# Patient Record
Sex: FEMALE | URBAN - METROPOLITAN AREA
[De-identification: names, ages, dates, MRNs, and addresses within clinical notes are randomized per-mention and may not be internally consistent; named-entity substitution may affect disease eponyms.]

---

## 2021-01-28 ENCOUNTER — TRANSFERRED RECORDS (OUTPATIENT)
Dept: HEALTH INFORMATION MANAGEMENT | Facility: CLINIC | Age: 26
End: 2021-01-28

## 2021-03-10 ENCOUNTER — VIRTUAL VISIT (OUTPATIENT)
Dept: GASTROENTEROLOGY | Facility: CLINIC | Age: 26
End: 2021-03-10
Payer: COMMERCIAL

## 2021-03-10 ENCOUNTER — TELEPHONE (OUTPATIENT)
Dept: GASTROENTEROLOGY | Facility: CLINIC | Age: 26
End: 2021-03-10

## 2021-03-10 DIAGNOSIS — K58.2 IRRITABLE BOWEL SYNDROME WITH BOTH CONSTIPATION AND DIARRHEA: ICD-10-CM

## 2021-03-10 DIAGNOSIS — K82.4 GALLBLADDER POLYP: Primary | ICD-10-CM

## 2021-03-10 PROCEDURE — 99204 OFFICE O/P NEW MOD 45 MIN: CPT | Mod: GT | Performed by: INTERNAL MEDICINE

## 2021-03-10 RX ORDER — CETIRIZINE HYDROCHLORIDE 10 MG/1
10 TABLET ORAL DAILY
COMMUNITY

## 2021-03-10 RX ORDER — IBUPROFEN 600 MG/1
TABLET, FILM COATED ORAL
COMMUNITY
Start: 2020-07-15

## 2021-03-10 RX ORDER — DESOGESTREL AND ETHINYL ESTRADIOL 0.15-0.03
KIT ORAL
COMMUNITY
Start: 2020-11-18

## 2021-03-10 RX ORDER — ALBUTEROL SULFATE 90 UG/1
1-2 AEROSOL, METERED RESPIRATORY (INHALATION)
COMMUNITY
Start: 2019-06-04

## 2021-03-10 NOTE — PATIENT INSTRUCTIONS
You may want to try using enteric coated peppermint oil (I.e IB guard) as needed for abdominal pain/cramping.    Consider starting a daily fiber supplement like citrucel or metamucil - continue to drink a lot of water.    Keep miralax on hand - start taking it once a day if you go 1-2 days without a bowel movement.    Please have a repeat ultrasound done in 6 months.     Let me know if you have questions or concerns - you can follow-up with me in about 3 months or return to your previous gastroenterologist.

## 2021-03-10 NOTE — PROGRESS NOTES
HPI:    Janiya  presents today for a video visit to discuss intermittent episodes of diarrhea and cramping abdominal pain.  Symptoms began suddenly in October with nausea, vomiting, abdominal pain and diarrhea.  Was seen in urgent care and had stool studies and blood work which were all normal.  Was referred to GI who she got in to see about a month later who did further work-up including a colonoscopy with random biopsies (normal aside from hyperplastic polyp) a CT which showed a vascular lesion of the liver which prompted an MRI which showed a hemangioma per patient.  Has continued to alternate between diarrhea and constipation.  Will go sometimes 4-5 days without a BM which will be followed by loose stools.  Has also had episodes of severe cramping and diarrhea most recently after eating out at a restaurant which prompted an ER visit - labs at the time were normal.  Had a US of the GB which showed small polyps but otherwise unremarkable.    Has been treated with bentyl but it caused her to feel fatigued and some dizziness at higher doses.  Has recently been seen by a nutritionist and started on a low fodmap diet which seems to help although she did have some cramping after eating take out last night.       FMH: aunt with colitis and cirrhosis        O:    Gen: no acute distress  HEENT: NCAT  Neck: normal ROM  Resp: nonlabored breathing  Neuro: no gross deficits  Psych: appropriate mood and affect    US:  FINDINGS:    GALLBLADDER: Gallbladder is decompressed without stones. Small cholesterol polyps, and cholesterolosis noted, without stones or pericholecystic fluid.    BILE DUCTS: No biliary dilatation. The common duct measures 2.8 mm.    LIVER: 2.4 cm echogenic lesion in the hepatic dome corresponds to the known hemangioma seen at recent MRI. No adjacent smaller echogenic lesion measuring only 8 mm in size has no definite MR correlate although sonographic imaging features are suggestive of an additional  small hemangioma.    RIGHT KIDNEY: No hydronephrosis.    PANCREAS: The visualized portions are normal.    No right upper quadrant ascites.    IMPRESSION:  1.  Gallbladder cholesterolosis, without sonographic evidence of cholelithiasis or cholecystitis.  2.  No etiology identified to explain patient's upper abdominal pain.    CT:    HEPATOBILIARY: There is an irregular 2.3 x 1.5 cm lesion in the dome of the right hepatic lobe with some peripheral faint enhancement. Given patient age, findings likely represent benign vascular lesions such as cavernous hemangioma, however, further evaluation with MRI liver recommended for confirmation. Low-attenuation subcentimeter liver lesion(s) compatible with benign cysts or other benign lesions. No specific evaluation or follow-up is recommended in a low risk patient. Single tiny gallstone. No bile duct dilatation.    PANCREAS: Normal.    SPLEEN: Normal.    ADRENAL GLANDS: Normal.    KIDNEYS/BLADDER: Normal.    BOWEL: Normal.    LYMPH NODES: Normal.    VASCULATURE: Unremarkable.    PELVIC ORGANS: Normal.    ADDITIONAL FINDINGS: Tiny amount of free fluid in the deep pelvis.    MUSCULOSKELETAL: Normal.    IMPRESSION:   1.  Irregular 2.3 x 1.5 cm indeterminate lesion in the dome of the right hepatic lobe as described above. Given patient age, findings while indeterminant likely represent benign vascular lesions such as cavernous hemangioma. Recommend further evaluation with MRI liver for confirmation.  2.  Single tiny gallstone.  3.  Tiny amount of free fluid in the deep pelvis likely physiologic.      Cscope:    Findings:  Normal finding.  Location - ileum.  Polyp location: ascending colon.  Quantity: 1.  Size: 3 mm.  Polyp shape:  sessile.         Maneuver: polypectomy was performed with a cold snare.       Removal:  complete.  Retrieval: complete.  Bleeding: none.  Hemorrhoids.  Internal and external hemorrhoids without bleeding.  Remainder of the exam is normal.  Random biopsies  were taken throughout the colon to rule out microscopic colitis.  Pathology Results:  A: COLON, RANDOM, BIOPSY:           1. Normal colonic mucosa           2. Negative for microscopic, active, and chronic colitis     B: COLON, ASCENDING, POLYP:           1. Hyperplastic polyp     Assessment and Plan:    # irritable bowel syndrome - mixed type - may benefit from adding a fiber supplement to help keep bowels more regular.  Also encouraged to use miralax as needed for constipation.  Did not tolerate bentyl due to side effects, she can try enteric coated peppermint oil PRN.  Does seem to be a strong dietary component to her symptoms - was encouraged to continue to keep a food journal and follow-up with nutritionist.    # gall bladder polyps - will repeat US in 6 months    RTC 3 months    Amanda Hameed,      Video-Visit Details     Type of service:  Video Visit     Video Start Time: 8:32 AM  Video End Time (time video stopped): 8:52 AM    Originating Location (pt. Location): Home     Distant Location (provider location):  [facility]      Mode of Communication:  Video Conference via VisTracks

## 2021-03-10 NOTE — PROGRESS NOTES
Janiya is a 25 year old who is being evaluated via a billable video visit.      How would you like to obtain your AVS? Mail a copy  If the video visit is dropped, the invitation should be resent by: Text to cell phone: [phone]   Will anyone else be joining your video visit? No      Video Start Time:   Video-Visit Details    Type of service:  Video Visit    Video End Time:    Originating Location (pt. Location):     Distant Location (provider location):  [facility]     Platform used for Video Visit:   Rony Mane CMA

## 2021-03-10 NOTE — TELEPHONE ENCOUNTER
1st attempt.    Left voicemail to schedule 3 mo virtual follow up with Dr. Hameed (around 6/10/21).     Also, for ultrasound    Katia Messer  Medical Specialty Procedure   [facility] [place]

## 2021-04-09 NOTE — TELEPHONE ENCOUNTER
2nd attempt.     See note below.    Katia Messer  Medical Specialty Procedure   [facility] [place]

## 2021-05-01 ENCOUNTER — HEALTH MAINTENANCE LETTER (OUTPATIENT)
Age: 26
End: 2021-05-01

## 2021-05-05 NOTE — TELEPHONE ENCOUNTER
3rd attempt.    Chart letter printed & sent.    Katia Messer  Medical Specialty Procedure   [facility] [place]

## 2021-06-22 ENCOUNTER — ANCILLARY PROCEDURE (OUTPATIENT)
Dept: ULTRASOUND IMAGING | Facility: CLINIC | Age: 26
End: 2021-06-22
Attending: INTERNAL MEDICINE
Payer: COMMERCIAL

## 2021-06-22 DIAGNOSIS — K82.4 GALLBLADDER POLYP: ICD-10-CM

## 2021-06-22 PROCEDURE — 76700 US EXAM ABDOM COMPLETE: CPT | Mod: GC | Performed by: RADIOLOGY

## 2021-06-25 ENCOUNTER — TELEPHONE (OUTPATIENT)
Dept: GASTROENTEROLOGY | Facility: CLINIC | Age: 26
End: 2021-06-25

## 2021-06-25 NOTE — TELEPHONE ENCOUNTER
Pt returned call and no one from the care team was available.  Please try calling Pt back.  Thanks.

## 2021-06-25 NOTE — TELEPHONE ENCOUNTER
Writer called patient to ask where hepatic imaging was completed as Dr Hameed has requested.  Patient  Told writer that the procedure was completed at [facility] in [place].  Writer called [facility] and spoke to Maria Esther who is faxing hepatic MRI  records to [facility].  Rony Mane CMA

## 2021-06-25 NOTE — TELEPHONE ENCOUNTER
Writer called patient and LVM to call us back to discuss where Hepatic MRI of the patient's liver was completed at Dr Hameed's request.  Rony Mane, CMA

## 2021-06-28 NOTE — TELEPHONE ENCOUNTER
Received MRI report from [facility]. Report sent to stat scanning to be entered into patients chart.     Leonela Bui LPN

## 2021-06-28 NOTE — TELEPHONE ENCOUNTER
MRI report scanned into patients chart. Message sent to provider with update.     Leonela Bui LPN

## 2021-10-11 ENCOUNTER — HEALTH MAINTENANCE LETTER (OUTPATIENT)
Age: 26
End: 2021-10-11

## 2022-05-22 ENCOUNTER — HEALTH MAINTENANCE LETTER (OUTPATIENT)
Age: 27
End: 2022-05-22

## 2022-09-25 ENCOUNTER — HEALTH MAINTENANCE LETTER (OUTPATIENT)
Age: 27
End: 2022-09-25

## 2023-06-04 ENCOUNTER — HEALTH MAINTENANCE LETTER (OUTPATIENT)
Age: 28
End: 2023-06-04

## 2025-01-14 ENCOUNTER — HOSPITAL ENCOUNTER (EMERGENCY)
Facility: CLINIC | Age: 30
Discharge: HOME | End: 2025-01-14
Attending: EMERGENCY MEDICINE | Admitting: EMERGENCY MEDICINE

## 2025-01-14 VITALS
OXYGEN SATURATION: 100 % | TEMPERATURE: 99.2 F | WEIGHT: 198 LBS | DIASTOLIC BLOOD PRESSURE: 97 MMHG | SYSTOLIC BLOOD PRESSURE: 138 MMHG | HEIGHT: 65 IN | BODY MASS INDEX: 32.99 KG/M2 | HEART RATE: 76 BPM | RESPIRATION RATE: 20 BRPM
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
## 2025-01-14 PROCEDURE — 99283 EMERGENCY DEPT VISIT LOW MDM: CPT | Performed by: EMERGENCY MEDICINE

## 2025-01-14 RX ORDER — CLINDAMYCIN HYDROCHLORIDE 300 MG/1
300 CAPSULE ORAL 4 TIMES DAILY
Qty: 40 CAPSULE | Refills: 0 | Status: SHIPPED | OUTPATIENT
Start: 2025-01-14 | End: 2025-01-24

## 2025-01-14 ASSESSMENT — COLUMBIA-SUICIDE SEVERITY RATING SCALE - C-SSRS
2. HAVE YOU ACTUALLY HAD ANY THOUGHTS OF KILLING YOURSELF IN THE PAST MONTH?: NO
6. HAVE YOU EVER DONE ANYTHING, STARTED TO DO ANYTHING, OR PREPARED TO DO ANYTHING TO END YOUR LIFE?: NO
1. IN THE PAST MONTH, HAVE YOU WISHED YOU WERE DEAD OR WISHED YOU COULD GO TO SLEEP AND NOT WAKE UP?: NO

## 2025-01-14 NOTE — DISCHARGE INSTRUCTIONS
Take ibuprofen for the next 24 hours.  Take the antibiotics as directed to prevent an infection.  Do not be surprised if it gets somewhat red over the next 24 hours but then it should start to recede after a day or 2.  I am glad you are already vaccinated against rabies.  Keep the wound clean and dry.  Return to the ER if spreading redness or fevers or worsening symptoms.  It was a pleasure to meet you.

## 2025-01-14 NOTE — ED PROVIDER NOTES
"  History     Chief Complaint   Patient presents with    Cat Bite     HPI  Janiya Navarro is a 29 year old female who presents to the emergency department secondary to cat bite to the right hand.  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  She was handling a kitten who is fully vaccinated against rabies and behaving normally when the kitten suddenly bit her right hand.  She has 2 small puncture marks.  There is no significant pain.  There is no laceration or tearing of the skin.  She is here for antibiotic prophylaxis.  She recently had to take postexposure prophylaxis against rabies secondary to an unvaccinated dog bite to the face so feels like she is plenty boosted against rabies at this time.  She has allergies to amoxicillin and cefprozil.    Allergies:  Allergies   Allergen Reactions    Amoxicillin Rash    Cefprozil Rash       Problem List:    There are no active problems to display for this patient.       Past Medical History:    History reviewed. No pertinent past medical history.    Past Surgical History:    History reviewed. No pertinent surgical history.    Family History:    No family history on file.    Social History:  Marital Status:  Single [1]  Social History     Tobacco Use    Smoking status: Never    Smokeless tobacco: Never   Substance Use Topics    Alcohol use: Never    Drug use: Never        Medications:    albuterol (PROAIR HFA/PROVENTIL HFA/VENTOLIN HFA) 108 (90 Base) MCG/ACT inhaler  APRI 0.15-30 MG-MCG tablet  cetirizine (ZYRTEC) 10 MG tablet  clindamycin (CLEOCIN) 300 MG capsule  ibuprofen (ADVIL/MOTRIN) 600 MG tablet          Review of Systems   All other systems reviewed and are negative.      Physical Exam   BP: (!) 138/97  Pulse: 76  Temp: 99.2  F (37.3  C)  Resp: 20  Height: 165.1 cm (5' 5\")  Weight: 89.8 kg (198 lb)  SpO2: 100 %      Physical Exam  Vitals and nursing note reviewed.   Constitutional:       General: She is not in acute distress.     " Appearance: Normal appearance. She is well-developed.   HENT:      Head: Normocephalic and atraumatic.   Eyes:      General: No scleral icterus.     Conjunctiva/sclera: Conjunctivae normal.   Cardiovascular:      Rate and Rhythm: Normal rate.   Pulmonary:      Effort: Pulmonary effort is normal. No respiratory distress.   Abdominal:      General: Abdomen is flat.   Musculoskeletal:      Cervical back: Normal range of motion and neck supple.   Skin:     General: Skin is warm and dry.      Findings: No rash.      Comments: 2 small, 1 mm puncture wounds to the right hand without surrounding redness, bleeding or pustular discharge.  There is no skin tears or lacerations.  There is no soft tissue swelling   Neurological:      Mental Status: She is alert and oriented to person, place, and time.         ED Course        Procedures                No results found for this or any previous visit (from the past 24 hours).    Medications - No data to display    Assessments & Plan (with Medical Decision Making)  Cat bite to the right hand  Patient is vaccinated against rabies and recently underwent postexposure prophylaxis for rabies so no rabies vaccine or booster are indicated at this time.  Wound does not appear infected or deep but given cat bite to the hand and concern for development of infection and so the patient.  Given she has allergies to amoxicillin we will choose clindamycin for antibiotic prophylaxis.  Patient was advised use ibuprofen over the next 24 hours to decrease inflammatory effect.  Patient is up-to-date on her tetanus vaccine as well.  Return to ER precautions and follow-up precautions discussed.  All questions answered prior to discharge.     I have reviewed the nursing notes.    I have reviewed the findings, diagnosis, plan and need for follow up with the patient.          New Prescriptions    CLINDAMYCIN (CLEOCIN) 300 MG CAPSULE    Take 1 capsule (300 mg) by mouth 4 times daily for 10 days.       Final  diagnoses:   Cat bite, initial encounter       1/14/2025   [facility] EMERGENCY DEPT       Mariano Herrera MD  01/14/25 0950

## 2025-01-14 NOTE — ED TRIAGE NOTES
Patient states she was bit by a cat at her work just PTA     Triage Assessment (Adult)       Row Name 01/14/25 0901          Triage Assessment    Airway WDL WDL        Respiratory WDL    Respiratory WDL WDL        Skin Circulation/Temperature WDL    Skin Circulation/Temperature WDL X  cat bite right pinky

## 2025-06-07 ENCOUNTER — HEALTH MAINTENANCE LETTER (OUTPATIENT)
Age: 30
End: 2025-06-07